# Patient Record
Sex: FEMALE | Race: WHITE | NOT HISPANIC OR LATINO | Employment: UNEMPLOYED | URBAN - METROPOLITAN AREA
[De-identification: names, ages, dates, MRNs, and addresses within clinical notes are randomized per-mention and may not be internally consistent; named-entity substitution may affect disease eponyms.]

---

## 2023-07-28 LAB — HBA1C MFR BLD HPLC: 5.9 %

## 2024-04-29 ENCOUNTER — OFFICE VISIT (OUTPATIENT)
Age: 64
End: 2024-04-29
Payer: COMMERCIAL

## 2024-04-29 VITALS
RESPIRATION RATE: 16 BRPM | OXYGEN SATURATION: 58 % | DIASTOLIC BLOOD PRESSURE: 80 MMHG | TEMPERATURE: 98.2 F | BODY MASS INDEX: 30.19 KG/M2 | HEART RATE: 98 BPM | WEIGHT: 176.8 LBS | HEIGHT: 64 IN | SYSTOLIC BLOOD PRESSURE: 134 MMHG

## 2024-04-29 DIAGNOSIS — R92.8 ABNORMAL MAMMOGRAM OF RIGHT BREAST: ICD-10-CM

## 2024-04-29 DIAGNOSIS — E55.9 INADEQUATE VITAMIN D AND VITAMIN D DERIVATIVE INTAKE: ICD-10-CM

## 2024-04-29 DIAGNOSIS — R73.03 PREDIABETES: ICD-10-CM

## 2024-04-29 DIAGNOSIS — F17.211 CIGARETTE NICOTINE DEPENDENCE IN REMISSION: ICD-10-CM

## 2024-04-29 DIAGNOSIS — E55.9 VITAMIN D INSUFFICIENCY: ICD-10-CM

## 2024-04-29 DIAGNOSIS — E78.5 DYSLIPIDEMIA: ICD-10-CM

## 2024-04-29 DIAGNOSIS — I50.22 CHRONIC SYSTOLIC CONGESTIVE HEART FAILURE (HCC): ICD-10-CM

## 2024-04-29 DIAGNOSIS — Z12.31 ENCOUNTER FOR SCREENING MAMMOGRAM FOR BREAST CANCER: ICD-10-CM

## 2024-04-29 DIAGNOSIS — Z78.0 ASYMPTOMATIC UNCOMPLICATED MENOPAUSE: ICD-10-CM

## 2024-04-29 DIAGNOSIS — Z11.59 NEED FOR HEPATITIS C SCREENING TEST: ICD-10-CM

## 2024-04-29 DIAGNOSIS — Z11.4 SCREENING FOR HIV (HUMAN IMMUNODEFICIENCY VIRUS): ICD-10-CM

## 2024-04-29 DIAGNOSIS — I10 PRIMARY HYPERTENSION: ICD-10-CM

## 2024-04-29 DIAGNOSIS — R41.3 MEMORY CHANGE: ICD-10-CM

## 2024-04-29 DIAGNOSIS — H93.13 TINNITUS OF BOTH EARS: ICD-10-CM

## 2024-04-29 DIAGNOSIS — Z00.00 HEALTHCARE MAINTENANCE: Primary | ICD-10-CM

## 2024-04-29 DIAGNOSIS — Z12.4 SCREENING FOR CERVICAL CANCER: ICD-10-CM

## 2024-04-29 PROCEDURE — 99214 OFFICE O/P EST MOD 30 MIN: CPT | Performed by: FAMILY MEDICINE

## 2024-04-29 PROCEDURE — 99386 PREV VISIT NEW AGE 40-64: CPT | Performed by: FAMILY MEDICINE

## 2024-04-29 RX ORDER — ASPIRIN 81 MG/1
81 TABLET, CHEWABLE ORAL DAILY
COMMUNITY

## 2024-04-29 RX ORDER — FUROSEMIDE 40 MG/1
40 TABLET ORAL 2 TIMES DAILY
COMMUNITY
End: 2024-05-08 | Stop reason: ALTCHOICE

## 2024-04-29 RX ORDER — LISINOPRIL 20 MG/1
20 TABLET ORAL DAILY
COMMUNITY

## 2024-04-29 RX ORDER — DULOXETIN HYDROCHLORIDE 30 MG/1
30 CAPSULE, DELAYED RELEASE ORAL DAILY
COMMUNITY

## 2024-04-29 RX ORDER — CARVEDILOL 12.5 MG/1
12.5 TABLET ORAL 2 TIMES DAILY WITH MEALS
COMMUNITY

## 2024-04-29 NOTE — PROGRESS NOTES
[facility] PRIMARY CARE  [address] [place]    Name: Mercedes Cardozo      YOB: 1960      MRN: [id number]  Encounter Provider: Arturo Dasilva MD      Encounter Date: 04/29/24      ASSESSMENT & PLAN      Alcohol/drug use: discussed moderation in alcohol intake, the recommendations for healthy alcohol use, and avoidance of illicit drug use.  Dental Health: discussed importance of regular tooth brushing, flossing, and dental visits.  Injury prevention: discussed safety/seat belts, safety helmets, smoke detectors, carbon dioxide detectors, and smoking near bedding or upholstery.  Sexual health: discussed sexually transmitted diseases, partner selection, use of condoms, avoidance of unintended pregnancy, and contraceptive alternatives.  Exercise: the importance of regular exercise/physical activity was discussed. Recommend exercise 3-5 times per week for at least 30 minutes.          Assessment/Plan      Healthcare Maintenance  Health maintenance completed today  - Medical history reviewed, including existing medical conditions, medications, and surgeries.   - Labs discussed to evaluate cholesterol, blood sugar, kidney function, liver function, and other important markers of health.  - BMI evaluated and discussed.  - Lifestyle and health counseling completed including diet, exercise habits, smoking status, alcohol consumption.   - Bone & Heart health reviewed  - Cancer screenings discussed: Mammogram/Pap smear/CT lung/colonoscopy.   - Vaccination status reviewed and pertinent immunizations and booster shots discussed.  - Skin examination: Discussed importance of sunscreen and other preventative measures for skin cancer.  - Mental health and wellbeing evaluated and discussed.  - Family history obtained to identify any of hereditary health risks.  Lab orders in place as discussed  Start/continue preventative measures as  discussed/advised  Complete preventative orders in place as recommended.   Refer to screenings problem list  Return in 2 to 4 weeks after completion of labs.    Screenings  Cervical cancer screening Not on file  Mammogram Not on file   Colonoscopy Not on file Not on file  Cologuard/FIT Not on file   CT lung    DEXA scan Not on file  Orders in place as discussed    Primary hypertension  Currently on lisinopril 20 mg daily  Blood Pressure: 134/80    Continue current regimen  Advised low-sodium, high potassium diet  Follow-up with blood work return after completion of labs.    Chronic systolic congestive heart failure  Previously followed cardiology outside of network.  Requires referral to to establish with [facility].  Currently on carvedilol 12.5 mg twice daily, Lasix 40 mg daily, aspirin 81.  Referral placed for cardiology  Discussed decreasing Lasix to 20 mg daily, then further decrease to as needed and monitor symptoms.  Patient mostly using to get rid of water weight, not necessarily for symptoms of shortness of breath on exertion at this time.  Return after completion of labs    Depression  Currently on Cymbalta 30 mg daily  Follow-up with blood work return in 2 to 4 weeks        DIAGNOSIS & ORDERS     Diagnoses and all orders for this visit:    Healthcare maintenance    Need for hepatitis C screening test  -     Hepatitis C Antibody; Future    Screening for HIV (human immunodeficiency virus)  -     HIV 1/2 AG/AB w Reflex [facility] for 2 yr old and above; Future    Encounter for screening mammogram for breast cancer  -     Mammo diagnostic bilateral w cad; Future    Screening for cervical cancer  -     Ambulatory referral to Obstetrics / Gynecology; Future    Vitamin D insufficiency  -     Vitamin D 25 hydroxy; Future    Inadequate vitamin D and vitamin D derivative intake  -     Vitamin D 25 hydroxy; Future    Asymptomatic uncomplicated menopause  -     DXA bone density spine hip and pelvis; Future    Cigarette  nicotine dependence in remission  -     CT lung screening program; Future    Prediabetes  -     CBC and differential; Future  -     Comprehensive metabolic panel; Future  -     Hemoglobin A1C; Future    Dyslipidemia  -     Lipid Panel with Direct LDL reflex; Future    Primary hypertension  -     TSH, 3rd generation with Free T4 reflex; Future    Memory change  -     Vitamin B12; Future    Abnormal mammogram of right breast  -     Mammo diagnostic bilateral w cad; Future    Chronic systolic congestive heart failure (HCC)  -     Ambulatory Referral to Cardiology; Future    Tinnitus of both ears  -     Ambulatory Referral to Otolaryngology; Future    Other orders  -     furosemide (LASIX) 40 mg tablet; Take 40 mg by mouth 2 (two) times a day  -     lisinopril (ZESTRIL) 20 mg tablet; Take 20 mg by mouth daily  -     carvedilol (COREG) 12.5 mg tablet; Take 12.5 mg by mouth 2 (two) times a day with meals  -     cholecalciferol 1,000 units tablet; Take 1,000 Units by mouth daily  -     DULoxetine (CYMBALTA) 30 mg delayed release capsule; Take 30 mg by mouth daily  -     aspirin 81 mg chewable tablet; Chew 81 mg daily            FOLLOW-UP PLANS   Return in about 2 weeks (around 5/13/2024).      Subjective:     Mercedes Cardozo is a 63 y.o. who is here for annual physical exam.    Mercedes Cardozo reports living daugther (asha 33 year old ).   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   Cigarette smoking quit 10 years 35-40 years.   Stent placement several years ago.       Concerns today: Yes, describe: weight, ear crackles, numbness of the upper extremities.    Diet and Physical Activity  Diet: well balanced diet  Exercise: 5-7 times a week on average  Do you struggle with your weight? Yes, describe: gained 5-10 lbs over the last year    General Health  Sleep: sleeps poorly and gets 7-8 hours of sleep on average   Hearing: normal - bilateral  Vision: wears  glasses and wears contacts  Dental: no dental visits for >1 year and brushes teeth twice daily    Mental Health  PHQ-2/9 Depression Screening    Little interest or pleasure in doing things: 0 - not at all  Feeling down, depressed, or hopeless: 0 - not at all  PHQ-2 Score: 0  PHQ-2 Interpretation: Negative depression screen       Anxiety: No  History of SI/SH: No  Significant past trauma that has impacted patient's mental health: No    /GYN Health  Urinary symptoms: none  Menstrual cycles: LMP, 40.   Sexually Active: No  single partner, contraception - none    Smoking/Alcohol Use:  Smoking Cig: Yes, describe: quit 10 years 35-40 years.   Vaping: No  Recreational drugs: No  Alcohol consumption: Yes, describe: weekends, wine    Review of Systems   Constitutional:  Negative for chills, fever and unexpected weight change.   HENT:  Negative for congestion, rhinorrhea and sore throat.    Eyes:  Negative for visual disturbance.   Respiratory:  Negative for chest tightness, shortness of breath and wheezing.    Cardiovascular:  Negative for chest pain.   Gastrointestinal:  Negative for abdominal pain, constipation, diarrhea, nausea and vomiting.   Endocrine: Negative for polyuria.   Genitourinary:  Negative for dysuria and hematuria.   Skin:  Negative for rash.   Neurological:  Negative for dizziness, weakness, light-headedness and headaches.   Psychiatric/Behavioral:  Negative for confusion.        Allergies:   Allergies   Allergen Reactions    Green Tea (Danica Sinensis) - Food Allergy Anaphylaxis    Shellfish Allergy - Food Allergy Anaphylaxis     Oysters only        Social history:   Social Determinants of Health     Tobacco Use: Medium Risk (4/29/2024)    Patient History     Smoking Tobacco Use: Former     Smokeless Tobacco Use: Never     Passive Exposure: Past   Alcohol Use: Not on file   Financial Resource Strain: Not on file   Food Insecurity: Not on file   Transportation Needs: Not on file   Physical Activity:  Not on file   Stress: Not on file   Social Connections: Not on file   Intimate Partner Violence: Not on file   Depression: Not at risk (2024)    PHQ-2     PHQ-2 Score: 0   Housing Stability: Not on file   Utilities: Not on file   Health Literacy: Not on file        Surgical history:   Past Surgical History:   Procedure Laterality Date    CARDIAC SURGERY       SECTION      HAND SURGERY          Family history:  Family History   Problem Relation Age of Onset    Arthritis Mother     Hypertension Mother     Stroke Mother     COPD Father     Leukemia Father     Hyperlipidemia Sister     Mental illness Brother             Current Medication List:     Current Outpatient Medications:     aspirin 81 mg chewable tablet, Chew 81 mg daily, Disp: , Rfl:     carvedilol (COREG) 12.5 mg tablet, Take 12.5 mg by mouth 2 (two) times a day with meals, Disp: , Rfl:     cholecalciferol 1,000 units tablet, Take 1,000 Units by mouth daily, Disp: , Rfl:     DULoxetine (CYMBALTA) 30 mg delayed release capsule, Take 30 mg by mouth daily, Disp: , Rfl:     furosemide (LASIX) 40 mg tablet, Take 40 mg by mouth 2 (two) times a day, Disp: , Rfl:     lisinopril (ZESTRIL) 20 mg tablet, Take 20 mg by mouth daily, Disp: , Rfl:       Objective:     Vitals:    24 1440   BP: 134/80   Pulse: 98   Resp: 16   Temp: 98.2 °F (36.8 °C)   SpO2: (!) 58%       Physical Exam  Vitals reviewed.   Constitutional:       General: She is not in acute distress.     Appearance: Normal appearance. She is not ill-appearing, toxic-appearing or diaphoretic.   HENT:      Head: Normocephalic and atraumatic.      Right Ear: External ear normal.      Left Ear: External ear normal.      Nose: Nose normal.      Mouth/Throat:      Mouth: Mucous membranes are moist.   Eyes:      General: No scleral icterus.        Right eye: No discharge.         Left eye: No discharge.      Extraocular Movements: Extraocular movements intact.      Conjunctiva/sclera: Conjunctivae  normal.   Cardiovascular:      Rate and Rhythm: Normal rate and regular rhythm.      Pulses: Normal pulses.      Heart sounds: Normal heart sounds.   Pulmonary:      Effort: Pulmonary effort is normal. No respiratory distress.      Breath sounds: Normal breath sounds.   Abdominal:      Palpations: Abdomen is soft.      Tenderness: There is no abdominal tenderness.   Musculoskeletal:         General: No swelling. Normal range of motion.      Cervical back: Normal range of motion.   Skin:     General: Skin is warm and dry.   Neurological:      General: No focal deficit present.      Mental Status: She is alert and oriented to person, place, and time.   Psychiatric:         Mood and Affect: Mood normal.         Behavior: Behavior normal.         Thought Content: Thought content normal.             Arturo Dasilva MD  Family Medicine Physician   [facility] PRIMARY CARE [place]

## 2024-04-29 NOTE — PATIENT INSTRUCTIONS
Together as a team our goal is to practice preventative care, avoid chronic diseases, and/or avoid further progression of current chronic diseases.   Here are my recommendations as we discussed during our office visit:    Recommendations for sleep hygiene.   Establish a consistent sleep schedule, which helps to regulate body's internal clock.  This includes the weekend.  Create a relaxing bedtime routine such as reading a book, taking a warm bath, or practicing relaxation techniques such as mindfulness/meditation/breath work  Optimize sleep environment by keeping it dark, quiet, and cool.  Invest in a comfortable mattress and pillow  Limit exposure to screens before bed at least an hour before bedtime.  The bluelight admitted can interfere with the production of sleep hormone melatonin.  Avoid large meals, caffeine, and nicotine close to bedtime.  These can disturb sleep.  Exercise regularly but avoid working out a few hours before bedtime.  Manage stress by practicing stress reducing techniques such as meditation, breath work, and/or yoga to help calm your mind.  Limit naps during the day, and avoid napping too close to bedtime.  Avoid clock watching, this can increase stress and anxiety about not sleeping.  Supplementations (over-the-counter)  Magnesium 400 mg to 1000 mg, start with low dosage and increase based on your response.   Chamomile tea   Supplements that contain kava, 5-hydroxytryptophan      Exercise:  150 minutes of moderate intensity workout for overall general health  200-300 minutes of moderate intensity workout for weight loss.   The first 30 minutes of exercising, the body will take energy from what we ate that day. Then after that 30-minute tamir, the body will take energy from the stored fats.  Therefore, it will be more beneficial to do longer sessions and less frequently in a week to help with our weight loss journey.  I would recommend 60-minute sessions 4 times a week   Strength training 2  times a week for good bone health    Nutritional intake (diet):  Remember, it is not about finding a diet to lose weight quickly, rather adjusting your lifestyle for healthy living long-term.   When we build good habits, it does not become difficult to maintain it.  Focus on a low-carb diet.  The best diet is Mediterranean diet, which is a low-carb diet.  There are good carbs and bad carbs, minimize the bad carbs.    Bad carbs: Refined carbohydrates or simple carbohydrates that have been processed and stripped of their natural fiber and nutrients.          These carbohydrates can lead to rapid spikes in blood sugar levels and are often associated with low nutritional value. The big 4: Bread, Rice, Pasta, Potatoes  Refined grains-white bread, white rice, pasta made from refined flour.  Sugary foods and beverages: Candy, pastries, sugary cereals, soda, and other sugary drinks.  Processed snacks: Chips, cookies, sugary granola bars  Sweetened breakfast cereals: Cereals with added sugars.  Sweets and desserts: Cakes, ice cream, pies  Good carbs: These are referred to complex carbohydrates that are unprocessed or minimally processed, providing more nutrients.  Here examples of good carbs:  Whole grains: Brown rice, quinoa, oats, whole-wheat products   Legumes: Lentils, chickpeas, black beans, kidney beans  Starchy vegetables: Sweet potatoes, butternut squash  Whole fruits: Berries, apples, oranges, bananas  Vegetables: Broccoli, spinach, kale, [place] sprouts  Nuts and seeds: Almonds, walnuts, Ayo seeds, flaxseeds.    Focus on eating whole foods.  What are whole foods?  Whole Foods refer to natural, unprocessed, or minimally processed foods that are as close to the original form as possible.  These foods are in their natural state and have undergone little to no refined or alteration.  Whole Foods are valued for their nutrient density, providing essential vitamins, minerals, fiber, and other beneficial  compounds.  Examples of whole foods include:  Fruits and vegetables without added preservatives or processing.    Whole grains-unrefined grains like brown rice, quinoa, and whole-wheat, which retain their brain, germ, and endosperm.  Legumes-beans, lentils, and peas in their national unprocessed date.  Nuts and seeds-on roasted, unsalted nuts and seeds without added oils or seasonings.  Meat and fish-fresh, unprocessed meats and fish without additives or preservatives.  Dairy-unprocessed dairy products such as milk, yogurt, and cheese without added sugars or artificial ingredients.  Eggs-eggs in their natural form without additives.    Protein requirement  Calculate your protein requirement in grams by multiplying your weight in kilograms by the recommended protein intake per kilogram.  This gives you an estimate of your daily protein requirement.  Age 15-18: 0.9 grams of protein per kilogram of body weight from male gender, 0.9 g of protein per kilogram of body weight for female gender.  Age 19+: 0.8 g of protein per kilogram of body weight for both male and female gender.  If you are physically active or trying to build muscle: 1.2-2.2 g/kg  Example: if you weigh 70 kg, to calculate your protein intake per day multiply 70 by 0.8 = 56 grams per day  To convert your weight to kilograms from pounds: Take your weight in pounds and divided by 2.2046 to get your weight to kilograms.    Sodium/salt  Compare sodium in foods like soup, bread, frozen and packaged meals, then choose the one with lower numbers.  Most Americans should limit their sodium intake to less than 2,300 mg/day.  All -Americans, people with diabetes, high blood pressure, kidney disease, should limit their sodium intake to 1,500 mg/day.    Cooking oil  Avoid the following oil for cooking: Canola, corn, vegetable, sunflower, peanut, sesame, grapeseed, flaxseed.  Even though it states it is heart healthy, however, they are significantly processed  and refined.   Would recommend instead the following cooking oil: Olive oil, coconut oil, avocado oil, ghee, butter.    Intermittent fasting:   There are several benefits of intermittent fasting including weight loss, improving insulin sensitivity, improving cardiovascular health by reducing risk factors like blood pressure, cholesterol levels, and triglycerides. It also promotes brain health, longevity, reduction in inflammatory markers, improves metabolic health, and some studies even suggest intermittent fasting to be protective against certain types of cancers.     The goal of intermittent fasting is to shutdown the insulin hormone, as we discussed, which is a hormone that negatively affects our health when it is around in high levels chronically.     Start intermittent fasting for 14 hours initially, then increase to 16 hours, with a goal to reach 18 hours.  During fasting - may drink water without any additives such as sweeteners, black coffee, tea without any additives including milk.   Eat nutritious meals 2 times a day  Avoid snacking in between meals.  If you end up snacking, snack on fruits/vegetables.  Initially it might be difficult, however, over time you will notice a positive change in your energy, mood, and weight.

## 2024-04-30 ENCOUNTER — TELEPHONE (OUTPATIENT)
Dept: CARDIOLOGY CLINIC | Facility: CLINIC | Age: 64
End: 2024-04-30

## 2024-04-30 NOTE — TELEPHONE ENCOUNTER
Caller: Mercedes     Doctor: Peterson     Reason for call: Pt has an ASAP referral in the system for Dr. Winkler. Unable to find appt availability in time frame of 1-2 weeks. Please advise.     Call back#: [phone]

## 2024-05-01 ENCOUNTER — HOSPITAL ENCOUNTER (OUTPATIENT)
Age: 64
Discharge: HOME | End: 2024-05-01
Payer: COMMERCIAL

## 2024-05-01 VITALS — HEIGHT: 63 IN | BODY MASS INDEX: 31.57 KG/M2

## 2024-05-01 DIAGNOSIS — Z78.0 ASYMPTOMATIC UNCOMPLICATED MENOPAUSE: ICD-10-CM

## 2024-05-01 PROCEDURE — 77080 DXA BONE DENSITY AXIAL: CPT

## 2024-05-01 NOTE — TELEPHONE ENCOUNTER
Please schedule patient with any next available provider. Can look at other [facility] office. Please place on cancellation list  Thanks

## 2024-05-02 NOTE — TELEPHONE ENCOUNTER
Lm for patient to call. Explained no availability but was offering Appt at [facility] at 11:00 tomorrow. Asked patient to call if she would like that appt.

## 2024-05-02 NOTE — TELEPHONE ENCOUNTER
Caller: Mercedes Cardozo    Doctor: New patient    Reason for call:     Patient called in today saying she received a phone call to schedule in [place], She expressed she would prefer [place], I let her know she has an ASAP referral and would need to be seen in a 1 week time frame but she mentioned she is fine also no symptoms and is transitioning care from [facility]. I scheduled her in [place] with  on 5/23/24. If any concerns with time frame please reach out to patient. She wants [place] as her Location of preference.     Call back#: [phone]

## 2024-05-08 ENCOUNTER — APPOINTMENT (OUTPATIENT)
Age: 64
End: 2024-05-08
Payer: COMMERCIAL

## 2024-05-08 DIAGNOSIS — E55.9 VITAMIN D INSUFFICIENCY: ICD-10-CM

## 2024-05-08 DIAGNOSIS — R41.3 MEMORY CHANGE: ICD-10-CM

## 2024-05-08 DIAGNOSIS — E55.9 INADEQUATE VITAMIN D AND VITAMIN D DERIVATIVE INTAKE: ICD-10-CM

## 2024-05-08 DIAGNOSIS — E78.5 DYSLIPIDEMIA: ICD-10-CM

## 2024-05-08 DIAGNOSIS — Z11.4 SCREENING FOR HIV (HUMAN IMMUNODEFICIENCY VIRUS): ICD-10-CM

## 2024-05-08 DIAGNOSIS — R73.03 PREDIABETES: ICD-10-CM

## 2024-05-08 DIAGNOSIS — I10 PRIMARY HYPERTENSION: ICD-10-CM

## 2024-05-08 DIAGNOSIS — Z11.59 NEED FOR HEPATITIS C SCREENING TEST: ICD-10-CM

## 2024-05-08 LAB
25(OH)D3 SERPL-MCNC: 29.7 NG/ML (ref 30–100)
ALBUMIN SERPL BCP-MCNC: 4.4 G/DL (ref 3.5–5)
ALP SERPL-CCNC: 62 U/L (ref 34–104)
ALT SERPL W P-5'-P-CCNC: 53 U/L (ref 7–52)
ANION GAP SERPL CALCULATED.3IONS-SCNC: 8 MMOL/L (ref 4–13)
AST SERPL W P-5'-P-CCNC: 29 U/L (ref 13–39)
BASOPHILS # BLD AUTO: 0.04 THOUSANDS/ÂΜL (ref 0–0.1)
BASOPHILS NFR BLD AUTO: 0 % (ref 0–1)
BILIRUB SERPL-MCNC: 0.28 MG/DL (ref 0.2–1)
BUN SERPL-MCNC: 20 MG/DL (ref 5–25)
CALCIUM SERPL-MCNC: 8.9 MG/DL (ref 8.4–10.2)
CHLORIDE SERPL-SCNC: 107 MMOL/L (ref 96–108)
CHOLEST SERPL-MCNC: 119 MG/DL
CO2 SERPL-SCNC: 29 MMOL/L (ref 21–32)
CREAT SERPL-MCNC: 1 MG/DL (ref 0.6–1.3)
EOSINOPHIL # BLD AUTO: 0.19 THOUSAND/ÂΜL (ref 0–0.61)
EOSINOPHIL NFR BLD AUTO: 2 % (ref 0–6)
ERYTHROCYTE [DISTWIDTH] IN BLOOD BY AUTOMATED COUNT: 14.3 % (ref 11.6–15.1)
EST. AVERAGE GLUCOSE BLD GHB EST-MCNC: 123 MG/DL
GFR SERPL CREATININE-BSD FRML MDRD: 60 ML/MIN/1.73SQ M
GLUCOSE P FAST SERPL-MCNC: 98 MG/DL (ref 65–99)
HBA1C MFR BLD: 5.9 %
HCT VFR BLD AUTO: 41.8 % (ref 34.8–46.1)
HDLC SERPL-MCNC: 42 MG/DL
HGB BLD-MCNC: 12.8 G/DL (ref 11.5–15.4)
IMM GRANULOCYTES # BLD AUTO: 0.01 THOUSAND/UL (ref 0–0.2)
IMM GRANULOCYTES NFR BLD AUTO: 0 % (ref 0–2)
LDLC SERPL CALC-MCNC: 58 MG/DL (ref 0–100)
LYMPHOCYTES # BLD AUTO: 4.74 THOUSANDS/ÂΜL (ref 0.6–4.47)
LYMPHOCYTES NFR BLD AUTO: 52 % (ref 14–44)
MCH RBC QN AUTO: 32 PG (ref 26.8–34.3)
MCHC RBC AUTO-ENTMCNC: 30.6 G/DL (ref 31.4–37.4)
MCV RBC AUTO: 105 FL (ref 82–98)
MONOCYTES # BLD AUTO: 0.52 THOUSAND/ÂΜL (ref 0.17–1.22)
MONOCYTES NFR BLD AUTO: 6 % (ref 4–12)
NEUTROPHILS # BLD AUTO: 3.67 THOUSANDS/ÂΜL (ref 1.85–7.62)
NEUTS SEG NFR BLD AUTO: 40 % (ref 43–75)
NRBC BLD AUTO-RTO: 0 /100 WBCS
PLATELET # BLD AUTO: 189 THOUSANDS/UL (ref 149–390)
PMV BLD AUTO: 11.5 FL (ref 8.9–12.7)
POTASSIUM SERPL-SCNC: 4.4 MMOL/L (ref 3.5–5.3)
PROT SERPL-MCNC: 6.9 G/DL (ref 6.4–8.4)
RBC # BLD AUTO: 4 MILLION/UL (ref 3.81–5.12)
SODIUM SERPL-SCNC: 144 MMOL/L (ref 135–147)
TRIGL SERPL-MCNC: 95 MG/DL
TSH SERPL DL<=0.05 MIU/L-ACNC: 2.43 UIU/ML (ref 0.45–4.5)
VIT B12 SERPL-MCNC: 346 PG/ML (ref 180–914)
WBC # BLD AUTO: 9.17 THOUSAND/UL (ref 4.31–10.16)

## 2024-05-08 PROCEDURE — 36415 COLL VENOUS BLD VENIPUNCTURE: CPT

## 2024-05-08 PROCEDURE — 82607 VITAMIN B-12: CPT

## 2024-05-08 PROCEDURE — 80061 LIPID PANEL: CPT

## 2024-05-08 PROCEDURE — 84443 ASSAY THYROID STIM HORMONE: CPT

## 2024-05-08 PROCEDURE — 87389 HIV-1 AG W/HIV-1&-2 AB AG IA: CPT

## 2024-05-08 PROCEDURE — 80053 COMPREHEN METABOLIC PANEL: CPT

## 2024-05-08 PROCEDURE — 82306 VITAMIN D 25 HYDROXY: CPT

## 2024-05-08 PROCEDURE — 86803 HEPATITIS C AB TEST: CPT

## 2024-05-08 PROCEDURE — 85025 COMPLETE CBC W/AUTO DIFF WBC: CPT

## 2024-05-08 PROCEDURE — 83036 HEMOGLOBIN GLYCOSYLATED A1C: CPT

## 2024-05-09 LAB
HCV AB SER QL: NORMAL
HIV 1+2 AB+HIV1 P24 AG SERPL QL IA: NORMAL
HIV 2 AB SERPL QL IA: NORMAL
HIV1 AB SERPL QL IA: NORMAL
HIV1 P24 AG SERPL QL IA: NORMAL

## 2024-05-10 ENCOUNTER — HOSPITAL ENCOUNTER (OUTPATIENT)
Dept: CT IMAGING | Facility: HOSPITAL | Age: 64
End: 2024-05-10
Attending: FAMILY MEDICINE
Payer: COMMERCIAL

## 2024-05-10 DIAGNOSIS — F17.211 CIGARETTE NICOTINE DEPENDENCE IN REMISSION: ICD-10-CM

## 2024-05-10 PROCEDURE — 71271 CT THORAX LUNG CANCER SCR C-: CPT

## 2024-05-20 ENCOUNTER — OFFICE VISIT (OUTPATIENT)
Age: 64
End: 2024-05-20
Payer: COMMERCIAL

## 2024-05-20 VITALS
HEART RATE: 60 BPM | SYSTOLIC BLOOD PRESSURE: 130 MMHG | RESPIRATION RATE: 14 BRPM | OXYGEN SATURATION: 98 % | WEIGHT: 178 LBS | TEMPERATURE: 97.4 F | BODY MASS INDEX: 30.39 KG/M2 | HEIGHT: 64 IN | DIASTOLIC BLOOD PRESSURE: 70 MMHG

## 2024-05-20 DIAGNOSIS — Z76.89 ENCOUNTER FOR WEIGHT MANAGEMENT: ICD-10-CM

## 2024-05-20 DIAGNOSIS — R73.03 PREDIABETES: ICD-10-CM

## 2024-05-20 DIAGNOSIS — R71.8 ELEVATED MCV: ICD-10-CM

## 2024-05-20 DIAGNOSIS — E55.9 VITAMIN D DEFICIENCY: Primary | ICD-10-CM

## 2024-05-20 PROCEDURE — 99215 OFFICE O/P EST HI 40 MIN: CPT | Performed by: FAMILY MEDICINE

## 2024-05-20 RX ORDER — METFORMIN HYDROCHLORIDE 500 MG/1
500 TABLET, EXTENDED RELEASE ORAL 2 TIMES DAILY WITH MEALS
Qty: 90 TABLET | Refills: 3 | Status: SHIPPED | OUTPATIENT
Start: 2024-05-20

## 2024-05-20 NOTE — PROGRESS NOTES
[facility]  [facility]     Name: Mercedes Cardozo   YOB: 1960   MRN: [id number]  Encounter Provider: Arturo Dasilva MD    Encounter Date: 5/20/2024    ASSESSMENT & PLAN      Assessment & Plan   Primary hypertension  Currently on lisinopril 20 mg daily  Blood Pressure: 130/70    Continue current regimen  Advised low-sodium, high potassium diet  Follow-up with blood work return after completion of labs.     Chronic systolic congestive heart failure  Previously followed cardiology outside of network.  Requires referral to to establish with [facility].  Currently on carvedilol 12.5 mg twice daily, Lasix 40 mg daily, aspirin 81.  Referral placed for cardiology  Discussed decreasing Lasix to 20 mg daily, then further decrease to as needed and monitor symptoms.  Patient mostly using to get rid of water weight, not necessarily for symptoms of shortness of breath on exertion at this time.  This was not discussed during this office visit, follow-up at next office visit.     Depression  Currently on Cymbalta 30 mg daily  Discussed improving coping mechanism  Continue current regimen    Dyslipidemia  Currently not on statin.   Reviewed & discussed labs including lipid panel   Triglycerides/HDL ratio: 2.26. Goal <2, 05/08/2024   Discussed importance of nutritional intake, exercising regularly.  Decrease calorie intake and advised low-carb diet as discussed   Incorporate omega-3 in your diet and start fish oil with omega-3, 2 g daily as discussed.  Limit cholesterol-rich foods as discussed  Monitor lipid panel in 6-12 months.     Chronic kidney disease stage II mild  Lab Results   Component Value Date    EGFR 60 05/08/2024    CREATININE 1.00 05/08/2024   Labs reviewed & discussed   Improve lifestyle and dietary changes to slow its progression  Follow-up with [facility] 3 to 6 months.    Improve lifestyle and dietary changes to slow its progression  Low-sodium, low-carb diet, limit  snacking, exercise regularly.  Avoid nephrotoxic agents including over-the-counter NSAIDs as discussed  Hydrate well with water.     Vitamin B12 insufficiency  Recommended sublingual vitamin B12 daily for 1 to 2 weeks then once a week for 2 months then once a month.  Follow-up with vitamin B12 in 2-3 months.    Weight Management, obesity, class I, BMI 30-34.9, prediabetes  Wt Readings from Last 3 Encounters:   05/20/24 80.7 kg (178 lb)   05/08/24 79.8 kg (176 lb)   04/29/24 80.2 kg (176 lb 12.8 oz)   Initial weight (05/20/24): 178 lbs, Body mass index is 31.04 kg/m².  05/20/24 Body mass index is 31.04 kg/m².  TWL: - lbs  Discussed recent labs including prediabetic range A1c.   Discussed the importance of making dietary and lifestyle changes to avoid progression and the need of multiple antidiabetic meds  Struggling to lose weight, weight changes over the last 12  months  Discussed intermittent fasting and its benefits, 14hr>16hr>18hrs.   Discussed importance of low-carb diet.   Discussed exercise recommendation of 200-300 minutes per week, longer sessions would be rec.  Started on metformin 1000 mg/day extended release  Work on low-carb diet, limit snacking, exercise regularly 200-300 minutes/week as discussed  Return in 4 to 6 weeks for side effect profile for continued care         Vitamin D deficiency  Reviewed & Discussed labs  Deficient vitamin D levels  Goal , preventative therapy   Start vitamin D3 50,000 units two times a week for 12 weeks  After weekly prescription dose, start vitamin D3 5,000 units daily supplements from over-the-counter   Follow-up with vitamin D level after completion of 12-week course   Advised vitamin K2 100-200 mcg daily, preventative measures for vascular calcification.           Depression Screening and Follow-up Plan: Patient was screened for depression during today's encounter. They screened negative with a PHQ-2 score of 0.         DIAGNOSIS & ORDERS     1. Vitamin D  "deficiency  -     Vitamin D 25 hydroxy; Future; Expected date: 08/20/2024  -     Cholecalciferol (Vitamin D3) 1.25 MG (36747 UT) TABS; 2 times a week for 12 weeks.  2. Elevated MCV  -     CBC and Platelet; Future; Expected date: 06/20/2024  3. Encounter for weight management  -     metFORMIN (GLUCOPHAGE-XR) 500 mg 24 hr tablet; Take 1 tablet (500 mg total) by mouth 2 (two) times a day with meals  4. Prediabetes  -     metFORMIN (GLUCOPHAGE-XR) 500 mg 24 hr tablet; Take 1 tablet (500 mg total) by mouth 2 (two) times a day with meals    FOLLOW-UP PLANS   Return in about 4 weeks (around 6/17/2024) for weight management].      Current Medication List:     Current Outpatient Medications:     aspirin 81 mg chewable tablet, Chew 81 mg daily, Disp: , Rfl:     carvedilol (COREG) 12.5 mg tablet, Take 12.5 mg by mouth 2 (two) times a day with meals, Disp: , Rfl:     Cholecalciferol (Vitamin D3) 1.25 MG (32240 UT) TABS, 2 times a week for 12 weeks., Disp: 24 tablet, Rfl: 0    DULoxetine (CYMBALTA) 30 mg delayed release capsule, Take 30 mg by mouth daily, Disp: , Rfl:     lisinopril (ZESTRIL) 20 mg tablet, Take 20 mg by mouth daily, Disp: , Rfl:     metFORMIN (GLUCOPHAGE-XR) 500 mg 24 hr tablet, Take 1 tablet (500 mg total) by mouth 2 (two) times a day with meals, Disp: 90 tablet, Rfl: 3    rosuvastatin (CRESTOR) 40 MG tablet, Take 20 mg by mouth daily, Disp: , Rfl:       Subjective   History of Present Illness       Mercedes Cardozo is here for an office visit.   Mercedes Cas reports living daugther (asha 33 year old ).   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   Cigarette smoking quit 10 years 35-40 years.   Stent placement several years ago.         Review of Systems        Objective:     /70 (BP Location: Right arm, Patient Position: Sitting, Cuff Size: Standard)   Pulse 60   Temp (!) 97.4 °F (36.3 °C) (Tympanic)   Resp 14   Ht 5' 3.5\" (1.613 m) " Comment: confirmed by wall measure  Wt 80.7 kg (178 lb)   SpO2 98%   BMI 31.04 kg/m²      Physical Exam  Vitals reviewed.   Constitutional:       General: She is not in acute distress.     Appearance: Normal appearance. She is obese. She is not ill-appearing, toxic-appearing or diaphoretic.   HENT:      Head: Normocephalic and atraumatic.      Right Ear: External ear normal.      Left Ear: External ear normal.      Nose: No congestion or rhinorrhea.   Eyes:      General: No scleral icterus.        Right eye: No discharge.         Left eye: No discharge.      Conjunctiva/sclera: Conjunctivae normal.   Cardiovascular:      Rate and Rhythm: Normal rate.   Pulmonary:      Effort: Pulmonary effort is normal. No respiratory distress.   Neurological:      General: No focal deficit present.      Mental Status: She is alert and oriented to person, place, and time.   Psychiatric:         Mood and Affect: Mood normal.         Behavior: Behavior normal.         Thought Content: Thought content normal.           Arturo Dasilva MD  Family Medicine Physician   [facility] PRIMARY CARE [place]        Administrative Statements     I have spent a total time of >40 minutes on 05/20/24 in caring for this patient including Diagnostic results, Prognosis, Risks and benefits of tx options, Instructions for management, Patient and family education, Importance of tx compliance, Risk factor reductions, Impressions, Counseling / Coordination of care, Documenting in the medical record, Reviewing / ordering tests, medicine, procedures  , and Obtaining or reviewing history  .

## 2024-05-20 NOTE — PATIENT INSTRUCTIONS
Recommendations for bone and heart health  Take vitamin D3 50,000 units twice a week for 12 weeks to boost to high-normal levels, prescription sent to pharmacy.  If insurance does not cover or it is too expensive, you can get high-dose vitamin D3 from vitamin shops or on [employer]. Can get Vitamin D3 at 10,000 units, take 5 capsules 2 times a week for 12 weeks.   During high-dose boost of vitamin D3, take Vitamin K2 100-200 mcg daily with food. It can be purchased from vitamin [employer] or [employer].   Once completed with the high-dose course, begin maintenance dose of vitamin D3 5,000 units-vitamin K2 200 mcg combination pill. Take daily with food to maintain high-normal levels of vitamin D. The vitamin K2 will help to prevent vascular calcification, read texts below.   Take fish oil with omega-3 at dosage of 0489-3425 mg daily to reduce risk of heart disease.   Follow-up with repeat vitamin D blood work after completing the high-dose prescription course to monitor for improvement.   Vitamin b12 sublingual daily for 1-2 weeks then once a week for 2 months then once a month.   Folic acid 1 mg tablet daily       Why is Vitamin D important?  Adequate vitamin D levels reduces the risk of fractures and osteoporosis.  Vitamin D is involved in modulating the immune system, enhancing the body's defense against infections and supporting overall immune function.  Adequate vitamin D levels are associated with better muscle strength and function.  Deficiency may contribute to muscle weakness and an increase risk of falls in older adults.  Multiple research studies have associated low vitamin D with increased risk of autoimmune disease, cancers including breast cancer, and multiple sclerosis.  Some research also link vitamin D deficiency to increase risk of cardiovascular disease. Vitamin D also plays a role in synthesis of various hormones, including insulin.  It may play a role in insulin sensitivity and glucose metabolism. Adequate  levels of vitamin D may also plays a role in supporting mental wellbeing.  In conclusion, lets bring your vitamin D levels up to >65 to prevent many diseases and conditions!     Why is vitamin K2 important?  Vitamin K2 helps ensure that the calcium we obtain through diet/supplements is directed to the bones for proper mineralization, contributing to bone strength and density.  Vitamin K2 also helps to activate proteins that prevent calcium from depositing in the arterial walls and reducing the risk of arterial calcification. Arterial calcification leads to heart and vascular disease. Especially since we are optimizing your vitamin D levels, one of the functions for vitamin D is to absorb calcium from the gastrointestinal track. This will increase calcium levels in the body. Supplementing vitamin K2 will ensure in removing the calcium from the blood vessels. This will reduce calcification of the vessels, reducing risk of heart and vascular disease.  Let's get your calcium where it belongs, into the bones and out of the vessels with vitamin K2!    Why is fish oil with omega-3 important?  Omega-3 fatty acids and fish oil or associated with reduced risk of heart disease.  They help to improve balance of cholesterol by increasing HDL (good cholesterol) and reducing triglycerides. They also reduce blood pressure and have anti-inflammatory effects contributing to overall cardiovascular health.  Omega-threes have anti-inflammatory properties that may benefit individuals with joint conditions such as rheumatoid arthritis, and other chronic inflammatory conditions such as IBD.  They can help reduce inflammation and alleviate symptoms. Let's prevent diseases, avoid the need of medications and start your healthy journey with these supplements (in addition to improving your nutritional intake and regular exercise)!          Together as a team our goal is to practice preventative care, avoid chronic diseases, and/or avoid further  progression of current chronic diseases.   Here are my recommendations as we discussed during our office visit:    Exercise:  150 minutes of moderate intensity workout for overall general health  200-300 minutes of moderate intensity workout for weight loss.   The first 30 minutes of exercising, the body will take energy from what we ate that day. Then after that 30-minute tamir, the body will take energy from the stored fats.  Therefore, it will be more beneficial to do longer sessions and less frequently in a week to help with our weight loss journey.  I would recommend 60-minute sessions 4 times a week   Strength training 2 times a week for good bone health    Nutritional intake (diet):  Remember, it is not about finding a diet to lose weight quickly, rather adjusting your lifestyle for healthy living long-term.   When we build good habits, it does not become difficult to maintain it.  Focus on a low-carb diet.  The best diet is Mediterranean diet, which is a low-carb diet.  There are good carbs and bad carbs, minimize the bad carbs.    Bad carbs: Refined carbohydrates or simple carbohydrates that have been processed and stripped of their natural fiber and nutrients.          These carbohydrates can lead to rapid spikes in blood sugar levels and are often associated with low nutritional value. The big 4: Bread, Rice, Pasta, Potatoes  Refined grains-white bread, white rice, pasta made from refined flour.  Sugary foods and beverages: Candy, pastries, sugary cereals, soda, and other sugary drinks.  Processed snacks: Chips, cookies, sugary granola bars  Sweetened breakfast cereals: Cereals with added sugars.  Sweets and desserts: Cakes, ice cream, pies  Good carbs: These are referred to complex carbohydrates that are unprocessed or minimally processed, providing more nutrients.  Here examples of good carbs:  Whole grains: Brown rice, quinoa, oats, whole-wheat products   Legumes: Lentils, chickpeas, black beans, kidney  beans  Starchy vegetables: Sweet potatoes, butternut squash  Whole fruits: Berries, apples, oranges, bananas  Vegetables: Broccoli, spinach, kale, [place] sprouts  Nuts and seeds: Almonds, walnuts, Ayo seeds, flaxseeds.    Focus on eating whole foods.  What are whole foods?  Whole Foods refer to natural, unprocessed, or minimally processed foods that are as close to the original form as possible.  These foods are in their natural state and have undergone little to no refined or alteration.  Whole Foods are valued for their nutrient density, providing essential vitamins, minerals, fiber, and other beneficial compounds.  Examples of whole foods include:  Fruits and vegetables without added preservatives or processing.    Whole grains-unrefined grains like brown rice, quinoa, and whole-wheat, which retain their brain, germ, and endosperm.  Legumes-beans, lentils, and peas in their national unprocessed date.  Nuts and seeds-on roasted, unsalted nuts and seeds without added oils or seasonings.  Meat and fish-fresh, unprocessed meats and fish without additives or preservatives.  Dairy-unprocessed dairy products such as milk, yogurt, and cheese without added sugars or artificial ingredients.  Eggs-eggs in their natural form without additives.    Protein requirement  Calculate your protein requirement in grams by multiplying your weight in kilograms by the recommended protein intake per kilogram.  This gives you an estimate of your daily protein requirement.  Age 15-18: 0.9 grams of protein per kilogram of body weight from male gender, 0.9 g of protein per kilogram of body weight for female gender.  Age 19+: 0.8 g of protein per kilogram of body weight for both male and female gender.  If you are physically active or trying to build muscle: 1.2-2.2 g/kg  Example: if you weigh 70 kg, to calculate your protein intake per day multiply 70 by 0.8 = 56 grams per day  To convert your weight to kilograms from pounds: Take your  weight in pounds and divided by 2.2046 to get your weight to kilograms.    Sodium/salt  Compare sodium in foods like soup, bread, frozen and packaged meals, then choose the one with lower numbers.  Most Americans should limit their sodium intake to less than 2,300 mg/day.  All -Americans, people with diabetes, high blood pressure, kidney disease, should limit their sodium intake to 1,500 mg/day.    Cooking oil  Avoid the following oil for cooking: Canola, corn, vegetable, sunflower, peanut, sesame, grapeseed, flaxseed.  Even though it states it is heart healthy, however, they are significantly processed and refined.   Would recommend instead the following cooking oil: Olive oil, coconut oil, avocado oil, ghee, butter.    Intermittent fasting:   There are several benefits of intermittent fasting including weight loss, improving insulin sensitivity, improving cardiovascular health by reducing risk factors like blood pressure, cholesterol levels, and triglycerides. It also promotes brain health, longevity, reduction in inflammatory markers, improves metabolic health, and some studies even suggest intermittent fasting to be protective against certain types of cancers.     The goal of intermittent fasting is to shutdown the insulin hormone, as we discussed, which is a hormone that negatively affects our health when it is around in high levels chronically.     Start intermittent fasting for 14 hours initially, then increase to 16 hours, with a goal to reach 18 hours.  During fasting - may drink water without any additives such as sweeteners, black coffee, tea without any additives including milk.   Eat nutritious meals 2 times a day  Avoid snacking in between meals.  If you end up snacking, snack on fruits/vegetables.  Initially it might be difficult, however, over time you will notice a positive change in your energy, mood, and weight.

## 2024-05-21 DIAGNOSIS — R91.1 NODULE OF UPPER LOBE OF RIGHT LUNG: Primary | ICD-10-CM

## 2024-05-21 NOTE — PROGRESS NOTES
CT chest lung cancer screening completed on 5/10/2024, result note reviewed.  It showed 7 mm right upper lobe pulmonary nodule.  Recommended short-term follow-up with 6-month LD CT. Expected date 11/7/2024. Will discuss with patient at next apt in July.

## 2024-05-23 ENCOUNTER — OFFICE VISIT (OUTPATIENT)
Dept: CARDIOLOGY CLINIC | Facility: CLINIC | Age: 64
End: 2024-05-23
Payer: COMMERCIAL

## 2024-05-23 VITALS
HEIGHT: 64 IN | RESPIRATION RATE: 16 BRPM | SYSTOLIC BLOOD PRESSURE: 130 MMHG | WEIGHT: 176 LBS | HEART RATE: 56 BPM | OXYGEN SATURATION: 97 % | DIASTOLIC BLOOD PRESSURE: 80 MMHG | BODY MASS INDEX: 30.05 KG/M2

## 2024-05-23 DIAGNOSIS — I10 PRIMARY HYPERTENSION: ICD-10-CM

## 2024-05-23 DIAGNOSIS — E78.5 DYSLIPIDEMIA: ICD-10-CM

## 2024-05-23 DIAGNOSIS — I50.9 CHRONIC CONGESTIVE HEART FAILURE, UNSPECIFIED HEART FAILURE TYPE (HCC): Primary | ICD-10-CM

## 2024-05-23 DIAGNOSIS — I25.10 CORONARY ARTERY DISEASE INVOLVING NATIVE CORONARY ARTERY OF NATIVE HEART WITHOUT ANGINA PECTORIS: ICD-10-CM

## 2024-05-23 PROCEDURE — 93000 ELECTROCARDIOGRAM COMPLETE: CPT | Performed by: INTERNAL MEDICINE

## 2024-05-23 PROCEDURE — 99204 OFFICE O/P NEW MOD 45 MIN: CPT | Performed by: INTERNAL MEDICINE

## 2024-05-23 RX ORDER — LISINOPRIL 20 MG/1
30 TABLET ORAL DAILY
Qty: 45 TABLET | Refills: 5 | Status: SHIPPED | OUTPATIENT
Start: 2024-05-23 | End: 2024-11-19

## 2024-05-23 NOTE — PATIENT INSTRUCTIONS
Increase lisinopril 30mg daily  Obtain echocardiogram to assess heart function  Continue rest of cardiac medications  2g sodium diet  2L fluid diet  Daily weights

## 2024-05-23 NOTE — PROGRESS NOTES
"Advanced Heart Failure Outpatient Consult Note - Mercedes Cardozo 63 y.o. female MRN: [id number]    Encounter: [id number]      Assessment/Plan:    Patient Active Problem List    Diagnosis Date Noted    Chronic systolic congestive heart failure (HCC) 05/24/2024    Coronary artery disease involving native coronary artery of native heart without angina pectoris 05/24/2024    Primary hypertension 05/24/2024    Dyslipidemia 05/24/2024     # Chronic heart failure, unspecified type  Euvolemic, currently off diuretic.  # CAD s/p stenting presumably to the LAD 2/2021  Rx: Aspirin, rosuvastatin, Coreg  # Hypertension, mildly elevated  Rx: Coreg and lisinopril  # Hyperlipidemia  5/8/2024 TG 95 HDL 42 LDL 58  Rx: Rosuvastatin 20 mg daily  # Prediabetes HbA1c 5.9 5/8/24    Today's Plan:  Patient reports overall doing well with good activity tolerance.  No active anginal or congestive heart failure symptoms.  Blood pressure mildly elevated.  Will increase lisinopril to 30 mg daily.  Will obtain echocardiogram to assess heart function.  Continue rest of cardiac medications at this time.  Patient to obtain medical records from previous cardiologist for review.  Advised on 2 g sodium and 2 L fluid diet, and daily weights.      HPI:   63-year-old female with past medical history of coronary artery disease status post PCI, heart failure, hypertension, hyperlipidemia and prediabetes who is here to establish care.  Was previously following up with cardiologist [facility].  Patient reports being hospitalized February 2021 with congestive heart failure and extremely elevated blood pressure.  He she also had cardiac catheterization at that time and had stenting x 1 to the \" maker\".  Her blood pressure was untreated prior to that hospitalization but she has had good follow-up posthospitalization.  Blood pressure control overall better and she has been on lisinopril and Coreg since then.  Recent home blood pressure in the 130s.  " Reports being on furosemide up until 1 month ago.  She denies any weight gain or shortness of breath while being off of diuretic.  She had a sporadic episode of chest pain while she was stating that lasted for a minute, no recurrence.  Denies any palpitations over the past 6 months to 1 year.  She is staying active kayaking and swimming 1 to 3 hours a day.  Social history: Stopped smoking 10 years ago, occasional alcohol intake up to a bottle on a weekend.  No illicit drug use  Family history: Sister and parents with hypertension, no known coronary disease      Past Medical History:   Diagnosis Date    Allergic     Depression     Elevated cholesterol     Fatty liver     Hypertension        Review of Systems   Constitutional:  Negative for chills and fever.   HENT:  Negative for ear pain and sore throat.    Eyes:  Negative for pain and visual disturbance.   Respiratory:  Negative for cough and shortness of breath.    Cardiovascular:  Negative for chest pain, palpitations and leg swelling.   Gastrointestinal:  Negative for abdominal distention, abdominal pain and vomiting.   Genitourinary:  Negative for dysuria and hematuria.   Musculoskeletal:  Negative for arthralgias and back pain.   Skin:  Negative for color change and rash.   Neurological:  Negative for dizziness, seizures, syncope and light-headedness.   All other systems reviewed and are negative.       Allergies   Allergen Reactions    Green Tea (Danica Sinensis) - Food Allergy Anaphylaxis    Shellfish Allergy - Food Allergy Anaphylaxis     Oysters only     .    Current Outpatient Medications:     aspirin 81 mg chewable tablet, Chew 81 mg daily, Disp: , Rfl:     carvedilol (COREG) 12.5 mg tablet, Take 12.5 mg by mouth 2 (two) times a day with meals, Disp: , Rfl:     Cholecalciferol (Vitamin D3) 1.25 MG (98164 UT) TABS, 2 times a week for 12 weeks., Disp: 24 tablet, Rfl: 0    DULoxetine (CYMBALTA) 30 mg delayed release capsule, Take 30 mg by mouth daily,  "Disp: , Rfl:     lisinopril (ZESTRIL) 20 mg tablet, Take 1.5 tablets (30 mg total) by mouth daily, Disp: 45 tablet, Rfl: 5    metFORMIN (GLUCOPHAGE-XR) 500 mg 24 hr tablet, Take 1 tablet (500 mg total) by mouth 2 (two) times a day with meals, Disp: 90 tablet, Rfl: 3    rosuvastatin (CRESTOR) 40 MG tablet, Take 20 mg by mouth daily, Disp: , Rfl:     Social History     Socioeconomic History    Marital status:      Spouse name: Not on file    Number of children: Not on file    Years of education: Not on file    Highest education level: Not on file   Occupational History    Not on file   Tobacco Use    Smoking status: Former     Types: Cigarettes     Passive exposure: Past    Smokeless tobacco: Never   Vaping Use    Vaping status: Never Used   Substance and Sexual Activity    Alcohol use: Yes     Alcohol/week: 4.0 standard drinks of alcohol     Types: 4 Glasses of wine per week    Drug use: Never    Sexual activity: Not on file   Other Topics Concern    Not on file   Social History Narrative    Not on file     Social Determinants of Health     Financial Resource Strain: Not on file   Food Insecurity: Not on file   Transportation Needs: Not on file   Physical Activity: Sufficiently Active (5/20/2024)    Exercise Vital Sign     Days of Exercise per Week: 7 days     Minutes of Exercise per Session: 50 min   Stress: Not on file   Social Connections: Not on file   Intimate Partner Violence: Not on file   Housing Stability: Not on file       Family History   Problem Relation Age of Onset    Arthritis Mother     Hypertension Mother     Stroke Mother     COPD Father     Leukemia Father     Hyperlipidemia Sister     Mental illness Brother        Physical Exam:    Vitals: Blood pressure 130/80, pulse 56, resp. rate 16, height 5' 3.5\" (1.613 m), weight 79.8 kg (176 lb), SpO2 97%., Body mass index is 30.69 kg/m².,   Wt Readings from Last 3 Encounters:   05/23/24 79.8 kg (176 lb)   05/20/24 80.7 kg (178 lb)   05/08/24 79.8 " "kg (176 lb)       Physical Exam  Constitutional:       General: She is not in acute distress.     Appearance: Normal appearance.   HENT:      Head: Normocephalic and atraumatic.      Mouth/Throat:      Mouth: Mucous membranes are moist.   Eyes:      Extraocular Movements: Extraocular movements intact.      Conjunctiva/sclera: Conjunctivae normal.   Neck:      Vascular: No JVD.   Cardiovascular:      Rate and Rhythm: Normal rate and regular rhythm.      Pulses: Normal pulses.      Heart sounds: No murmur heard.     No friction rub. No gallop.   Pulmonary:      Effort: Pulmonary effort is normal. No respiratory distress.      Breath sounds: No wheezing, rhonchi or rales.   Abdominal:      General: There is no distension.      Palpations: Abdomen is soft.      Tenderness: There is no abdominal tenderness. There is no guarding.   Musculoskeletal:         General: No deformity or signs of injury.      Cervical back: Neck supple.      Right lower leg: No edema.      Left lower leg: No edema.   Skin:     General: Skin is warm and dry.      Capillary Refill: Capillary refill takes less than 2 seconds.   Neurological:      General: No focal deficit present.      Mental Status: She is alert and oriented to person, place, and time.   Psychiatric:         Mood and Affect: Mood normal.         Labs & Results:    Lab Results   Component Value Date    WBC 9.17 05/08/2024    HGB 12.8 05/08/2024    HCT 41.8 05/08/2024     (H) 05/08/2024     05/08/2024     Lab Results   Component Value Date    SODIUM 144 05/08/2024    K 4.4 05/08/2024     05/08/2024    CO2 29 05/08/2024    BUN 20 05/08/2024    CREATININE 1.00 05/08/2024    CALCIUM 8.9 05/08/2024     No results found for: \"NTBNP\"   Lab Results   Component Value Date    CHOLESTEROL 119 05/08/2024     Lab Results   Component Value Date    HDL 42 (L) 05/08/2024     Lab Results   Component Value Date    TRIG 95 05/08/2024     No results found for: \"NONHDLC\"    EKG " personally reviewed by Janel Mckeon MD.     Counseling / Coordination of Care  Time spent today 40 minutes.  Greater than 50% of total time was spent with the patient and / or family counseling and / or coordination of care.  We discussed diagnoses, most recent studies and any changes in treatment    Thank you for the opportunity to participate in the care of this patient.    JANEL MCKEON MD  ADVANCED HEART FAILURE AND MECHANICAL CIRCULATORY SUPPORT  [facility]

## 2024-05-24 PROBLEM — I50.22 CHRONIC SYSTOLIC CONGESTIVE HEART FAILURE (HCC): Status: ACTIVE | Noted: 2024-05-24

## 2024-05-24 PROBLEM — E78.5 DYSLIPIDEMIA: Status: ACTIVE | Noted: 2024-05-24

## 2024-05-24 PROBLEM — I25.10 CORONARY ARTERY DISEASE INVOLVING NATIVE CORONARY ARTERY OF NATIVE HEART WITHOUT ANGINA PECTORIS: Status: ACTIVE | Noted: 2024-05-24

## 2024-05-24 PROBLEM — I10 PRIMARY HYPERTENSION: Status: ACTIVE | Noted: 2024-05-24

## 2024-05-30 ENCOUNTER — HOSPITAL ENCOUNTER (OUTPATIENT)
Dept: MAMMOGRAPHY | Facility: CLINIC | Age: 64
End: 2024-05-30
Payer: COMMERCIAL

## 2024-05-30 ENCOUNTER — HOSPITAL ENCOUNTER (OUTPATIENT)
Dept: ULTRASOUND IMAGING | Facility: CLINIC | Age: 64
End: 2024-05-30
Payer: COMMERCIAL

## 2024-05-30 VITALS — BODY MASS INDEX: 30.05 KG/M2 | HEIGHT: 64 IN | WEIGHT: 176 LBS

## 2024-05-30 DIAGNOSIS — Z12.31 ENCOUNTER FOR SCREENING MAMMOGRAM FOR BREAST CANCER: ICD-10-CM

## 2024-05-30 DIAGNOSIS — R92.8 ABNORMAL MAMMOGRAM OF RIGHT BREAST: ICD-10-CM

## 2024-05-30 PROCEDURE — G0279 TOMOSYNTHESIS, MAMMO: HCPCS

## 2024-05-30 PROCEDURE — 77066 DX MAMMO INCL CAD BI: CPT

## 2024-05-30 PROCEDURE — 76642 ULTRASOUND BREAST LIMITED: CPT

## 2024-06-27 ENCOUNTER — HOSPITAL ENCOUNTER (OUTPATIENT)
Dept: NON INVASIVE DIAGNOSTICS | Facility: CLINIC | Age: 64
Discharge: HOME | End: 2024-06-27
Payer: COMMERCIAL

## 2024-06-27 VITALS
SYSTOLIC BLOOD PRESSURE: 130 MMHG | HEART RATE: 48 BPM | BODY MASS INDEX: 31.18 KG/M2 | HEIGHT: 63 IN | DIASTOLIC BLOOD PRESSURE: 80 MMHG | WEIGHT: 176 LBS

## 2024-06-27 DIAGNOSIS — R73.03 PREDIABETES: ICD-10-CM

## 2024-06-27 DIAGNOSIS — I50.9 CHRONIC CONGESTIVE HEART FAILURE, UNSPECIFIED HEART FAILURE TYPE (HCC): ICD-10-CM

## 2024-06-27 DIAGNOSIS — Z76.89 ENCOUNTER FOR WEIGHT MANAGEMENT: ICD-10-CM

## 2024-06-27 LAB
AORTIC ROOT: 3.4 CM
APICAL FOUR CHAMBER EJECTION FRACTION: 61 %
AV LVOT PEAK GRADIENT: 4 MMHG
AV PEAK GRADIENT: 5 MMHG
BSA FOR ECHO PROCEDURE: 1.83 M2
E WAVE DECELERATION TIME: 224 MS
E/A RATIO: 0.88
FRACTIONAL SHORTENING: 26 (ref 28–44)
INTERVENTRICULAR SEPTUM IN DIASTOLE (PARASTERNAL SHORT AXIS VIEW): 1.2 CM
INTERVENTRICULAR SEPTUM: 1.2 CM (ref 0.6–1.1)
LAAS-AP2: 16.5 CM2
LAAS-AP4: 12.4 CM2
LEFT ATRIUM SIZE: 3.7 CM
LEFT ATRIUM VOLUME (MOD BIPLANE): 36 ML
LEFT ATRIUM VOLUME INDEX (MOD BIPLANE): 19.6 ML/M2
LEFT INTERNAL DIMENSION IN SYSTOLE: 3.1 CM (ref 2.1–4)
LEFT VENTRICULAR INTERNAL DIMENSION IN DIASTOLE: 4.2 CM (ref 3.5–6)
LEFT VENTRICULAR POSTERIOR WALL IN END DIASTOLE: 1 CM
LEFT VENTRICULAR STROKE VOLUME: 39 ML
LVSV (TEICH): 39 ML
MV E'TISSUE VEL-SEP: 7 CM/S
MV PEAK A VEL: 1.15 M/S
MV PEAK E VEL: 101 CM/S
MV STENOSIS PRESSURE HALF TIME: 65 MS
MV VALVE AREA P 1/2 METHOD: 3.38
RIGHT ATRIUM AREA SYSTOLE A4C: 14 CM2
RIGHT VENTRICLE ID DIMENSION: 3 CM
SL CV LEFT ATRIUM LENGTH A2C: 5 CM
SL CV LV EF: 61
SL CV PED ECHO LEFT VENTRICLE DIASTOLIC VOLUME (MOD BIPLANE) 2D: 77 ML
SL CV PED ECHO LEFT VENTRICLE SYSTOLIC VOLUME (MOD BIPLANE) 2D: 38 ML
TR MAX PG: 28 MMHG
TR PEAK VELOCITY: 2.6 M/S
TRICUSPID ANNULAR PLANE SYSTOLIC EXCURSION: 1.7 CM
TRICUSPID VALVE PEAK REGURGITATION VELOCITY: 2.64 M/S

## 2024-06-27 PROCEDURE — 93306 TTE W/DOPPLER COMPLETE: CPT | Performed by: INTERNAL MEDICINE

## 2024-06-27 PROCEDURE — 93306 TTE W/DOPPLER COMPLETE: CPT

## 2024-06-27 RX ORDER — METFORMIN HYDROCHLORIDE 500 MG/1
500 TABLET, EXTENDED RELEASE ORAL 2 TIMES DAILY WITH MEALS
Qty: 180 TABLET | Refills: 1 | Status: SHIPPED | OUTPATIENT
Start: 2024-06-27

## 2024-07-09 ENCOUNTER — OFFICE VISIT (OUTPATIENT)
Age: 64
End: 2024-07-09
Payer: COMMERCIAL

## 2024-07-09 VITALS
BODY MASS INDEX: 30.33 KG/M2 | WEIGHT: 171.2 LBS | TEMPERATURE: 96.8 F | DIASTOLIC BLOOD PRESSURE: 68 MMHG | HEART RATE: 58 BPM | HEIGHT: 63 IN | OXYGEN SATURATION: 98 % | SYSTOLIC BLOOD PRESSURE: 120 MMHG | RESPIRATION RATE: 14 BRPM

## 2024-07-09 DIAGNOSIS — E78.5 DYSLIPIDEMIA: ICD-10-CM

## 2024-07-09 DIAGNOSIS — I10 PRIMARY HYPERTENSION: ICD-10-CM

## 2024-07-09 DIAGNOSIS — N18.2 CKD (CHRONIC KIDNEY DISEASE) STAGE 2, GFR 60-89 ML/MIN: ICD-10-CM

## 2024-07-09 DIAGNOSIS — I50.22 CHRONIC SYSTOLIC CONGESTIVE HEART FAILURE (HCC): ICD-10-CM

## 2024-07-09 DIAGNOSIS — Z76.89 ENCOUNTER FOR WEIGHT MANAGEMENT: Primary | ICD-10-CM

## 2024-07-09 PROCEDURE — 99214 OFFICE O/P EST MOD 30 MIN: CPT | Performed by: FAMILY MEDICINE

## 2024-07-09 NOTE — PROGRESS NOTES
[facility] PRIMARY CARE  Ambulatory visit     Name: Mercedes Cardozo   YOB: 1960   MRN: [id number]  Encounter Provider: Arturo Dasilva MD    Encounter Date: 7/9/2024    ASSESSMENT & PLAN    Assessment & Plan   Primary hypertension  Blood Pressure: 120/68    Currently on lisinopril 20 mg daily  Continue current regimen  Advised low-sodium, high potassium diet  Follow-up with blood work return after completion of labs.     Chronic systolic congestive heart failure  Following cardiology, Dr. Mckeon.    Currently on carvedilol 12.5 mg twice daily, Lasix 40 mg daily, aspirin 81.  Patient has discontinued Lasix and today reports no has not noted shortness of breath on exertion, swelling since the discontinuation.  Continue off of Lasix.    Symptomatic management of CHF with Lasix as discussed  Follow-up with cardiology to discuss possibility of starting phentermine for weight loss, next appointment August 15.  Started patient on Jardiance 10 mg daily with history of CHF and CKD.  Which will also help with weight loss.     Depression  Currently on Cymbalta 30 mg daily  Discussed improving coping mechanism    Dyslipidemia  Currently not on statin.   Triglycerides/HDL ratio: 2.26. Goal <2, 05/08/2024   Discussed importance of nutritional intake, exercising regularly.  Decrease calorie intake and advised low-carb diet as discussed   Incorporate omega-3 in your diet and start fish oil with omega-3, 2 g daily as discussed.  Limit cholesterol-rich foods as discussed  Monitor lipid panel in 6-12 months.     Chronic kidney disease stage II mild  Started Jardiance 10 mg daily, continue off of Lasix.  Follow-up with [facility] prior to next appointment in 3 months.  Improve lifestyle and dietary changes to slow its progression  Follow-up with BMP 3 to 6 months.    Low-sodium, low-carb diet, limit snacking, exercise regularly.  Avoid nephrotoxic agents including over-the-counter NSAIDs as discussed  Hydrate well  with water.      Vitamin B12 insufficiency  Previously recommended sublingual vitamin B12 daily for 1 to 2 weeks then once a week for 2 months then once a month.  Follow-up with vitamin B12 in 2-3 months.    Weight Management, obesity, class I, BMI 30-34.9, prediabetes  Wt Readings from Last 3 Encounters:   07/09/24 77.7 kg (171 lb 3.2 oz)   06/27/24 79.8 kg (176 lb)   05/30/24 79.8 kg (176 lb)   Initial weight (05/20/24): 178 lbs, Body mass index is 31.04 kg/m².  05/20/24 Body mass index is 31.04 kg/m².  TWL: -7 lbs  Previously started on metformin 1000 mg/day extended release, decreased to 500 mg at night due to side effect of sleepiness in the morning.  Considered starting phentermine however with history of CHF it would be contraindicated.  May discussed with cardiology at next appointment.  With history of CKD and CHF, started patient on Jardiance 10 mg daily.  Work on low-carb diet, limit snacking, exercise regularly 200-300 minutes/week as discussed  Return in 4 to 6 weeks for side effect profile for continued care            Depression Screening and Follow-up Plan: Patient was screened for depression during today's encounter. They screened negative with a PHQ-2 score of 0.       DIAGNOSIS & ORDERS   1. Encounter for weight management  2. Chronic systolic congestive heart failure (HCC)  -     Empagliflozin (JARDIANCE) 10 MG TABS tablet; Take 1 tablet (10 mg total) by mouth daily  3. CKD (chronic kidney disease) stage 2, GFR 60-89 ml/min  -     Empagliflozin (JARDIANCE) 10 MG TABS tablet; Take 1 tablet (10 mg total) by mouth daily  -     Basic metabolic panel; Future; Expected date: 08/09/2024  4. Dyslipidemia  5. Primary hypertension    FOLLOW-UP PLANS   Return in about 3 months (around 10/9/2024) for weight management, ckd.    Current Medication List:     Current Outpatient Medications:     aspirin 81 mg chewable tablet, Chew 81 mg daily, Disp: , Rfl:     carvedilol (COREG) 12.5 mg tablet, Take 12.5 mg by  "mouth 2 (two) times a day with meals, Disp: , Rfl:     Cholecalciferol (Vitamin D3) 1.25 MG (36397 UT) TABS, 2 times a week for 12 weeks., Disp: 24 tablet, Rfl: 0    DULoxetine (CYMBALTA) 30 mg delayed release capsule, Take 30 mg by mouth daily, Disp: , Rfl:     Empagliflozin (JARDIANCE) 10 MG TABS tablet, Take 1 tablet (10 mg total) by mouth daily, Disp: 30 tablet, Rfl: 5    lisinopril (ZESTRIL) 20 mg tablet, Take 1.5 tablets (30 mg total) by mouth daily, Disp: 45 tablet, Rfl: 5    metFORMIN (GLUCOPHAGE-XR) 500 mg 24 hr tablet, TAKE 1 TABLET BY MOUTH TWICE A DAY WITH MEALS, Disp: 180 tablet, Rfl: 1    rosuvastatin (CRESTOR) 40 MG tablet, Take 20 mg by mouth daily, Disp: , Rfl:   Subjective   History of Present Illness       Mercedes Cardozo is here for an office visit.   Mercedes Cardozo reports living daugther (asha 33 year old ).   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   Cigarette smoking quit 10 years 35-40 years.   Stent placement several years ago.       Review of Systems    Objective:     /68 (BP Location: Left arm, Patient Position: Sitting, Cuff Size: Standard)   Pulse 58   Temp (!) 96.8 °F (36 °C) (Tympanic)   Resp 14   Ht 5' 3\" (1.6 m)   Wt 77.7 kg (171 lb 3.2 oz)   SpO2 98%   BMI 30.33 kg/m²      Physical Exam  Vitals reviewed.   Constitutional:       General: She is not in acute distress.     Appearance: Normal appearance. She is obese. She is not ill-appearing, toxic-appearing or diaphoretic.   HENT:      Head: Normocephalic and atraumatic.      Right Ear: External ear normal.      Left Ear: External ear normal.      Nose: No congestion or rhinorrhea.   Eyes:      General: No scleral icterus.        Right eye: No discharge.         Left eye: No discharge.      Conjunctiva/sclera: Conjunctivae normal.   Cardiovascular:      Rate and Rhythm: Normal rate.   Pulmonary:      Effort: Pulmonary effort is normal. No respiratory " distress.   Neurological:      General: No focal deficit present.      Mental Status: She is alert and oriented to person, place, and time.   Psychiatric:         Mood and Affect: Mood normal.         Behavior: Behavior normal.         Thought Content: Thought content normal.           Arturo Dasilva MD  Family Medicine Physician   [facility] PRIMARY [facility] [place]        Administrative Statements

## 2024-07-24 DIAGNOSIS — F41.9 ANXIETY: Primary | ICD-10-CM

## 2024-07-25 RX ORDER — DULOXETIN HYDROCHLORIDE 30 MG/1
30 CAPSULE, DELAYED RELEASE ORAL DAILY
Qty: 90 CAPSULE | Refills: 1 | Status: SHIPPED | OUTPATIENT
Start: 2024-07-25

## 2024-08-09 ENCOUNTER — APPOINTMENT (OUTPATIENT)
Age: 64
End: 2024-08-09
Payer: COMMERCIAL

## 2024-08-09 DIAGNOSIS — N18.2 CKD (CHRONIC KIDNEY DISEASE) STAGE 2, GFR 60-89 ML/MIN: ICD-10-CM

## 2024-08-09 DIAGNOSIS — E55.9 VITAMIN D DEFICIENCY: ICD-10-CM

## 2024-08-09 DIAGNOSIS — R71.8 ELEVATED MCV: ICD-10-CM

## 2024-08-09 DIAGNOSIS — I50.9 CHRONIC CONGESTIVE HEART FAILURE, UNSPECIFIED HEART FAILURE TYPE (HCC): ICD-10-CM

## 2024-08-09 LAB
25(OH)D3 SERPL-MCNC: >120 NG/ML (ref 30–100)
ANION GAP SERPL CALCULATED.3IONS-SCNC: 11 MMOL/L (ref 4–13)
BUN SERPL-MCNC: 17 MG/DL (ref 5–25)
CALCIUM SERPL-MCNC: 9.1 MG/DL (ref 8.4–10.2)
CHLORIDE SERPL-SCNC: 106 MMOL/L (ref 96–108)
CO2 SERPL-SCNC: 25 MMOL/L (ref 21–32)
CREAT SERPL-MCNC: 1.08 MG/DL (ref 0.6–1.3)
ERYTHROCYTE [DISTWIDTH] IN BLOOD BY AUTOMATED COUNT: 14.5 % (ref 11.6–15.1)
GFR SERPL CREATININE-BSD FRML MDRD: 55 ML/MIN/1.73SQ M
GLUCOSE P FAST SERPL-MCNC: 94 MG/DL (ref 65–99)
HCT VFR BLD AUTO: 42.4 % (ref 34.8–46.1)
HGB BLD-MCNC: 13 G/DL (ref 11.5–15.4)
MCH RBC QN AUTO: 31 PG (ref 26.8–34.3)
MCHC RBC AUTO-ENTMCNC: 30.7 G/DL (ref 31.4–37.4)
MCV RBC AUTO: 101 FL (ref 82–98)
PLATELET # BLD AUTO: 205 THOUSANDS/UL (ref 149–390)
PMV BLD AUTO: 10.7 FL (ref 8.9–12.7)
POTASSIUM SERPL-SCNC: 4.6 MMOL/L (ref 3.5–5.3)
RBC # BLD AUTO: 4.2 MILLION/UL (ref 3.81–5.12)
SODIUM SERPL-SCNC: 142 MMOL/L (ref 135–147)
WBC # BLD AUTO: 8.9 THOUSAND/UL (ref 4.31–10.16)

## 2024-08-09 PROCEDURE — 80048 BASIC METABOLIC PNL TOTAL CA: CPT

## 2024-08-09 PROCEDURE — 36415 COLL VENOUS BLD VENIPUNCTURE: CPT

## 2024-08-09 PROCEDURE — 82306 VITAMIN D 25 HYDROXY: CPT

## 2024-08-09 PROCEDURE — 85027 COMPLETE CBC AUTOMATED: CPT

## 2024-08-09 RX ORDER — LISINOPRIL 20 MG/1
TABLET ORAL
Qty: 135 TABLET | Refills: 1 | Status: SHIPPED | OUTPATIENT
Start: 2024-08-09 | End: 2024-08-15 | Stop reason: SDUPTHER

## 2024-08-15 ENCOUNTER — OFFICE VISIT (OUTPATIENT)
Dept: CARDIOLOGY CLINIC | Facility: CLINIC | Age: 64
End: 2024-08-15
Payer: COMMERCIAL

## 2024-08-15 VITALS
DIASTOLIC BLOOD PRESSURE: 74 MMHG | HEART RATE: 58 BPM | WEIGHT: 166 LBS | HEIGHT: 63 IN | BODY MASS INDEX: 29.41 KG/M2 | OXYGEN SATURATION: 96 % | SYSTOLIC BLOOD PRESSURE: 120 MMHG | RESPIRATION RATE: 16 BRPM

## 2024-08-15 DIAGNOSIS — I25.10 CORONARY ARTERY DISEASE INVOLVING NATIVE CORONARY ARTERY OF NATIVE HEART WITHOUT ANGINA PECTORIS: ICD-10-CM

## 2024-08-15 DIAGNOSIS — I50.32 HEART FAILURE WITH IMPROVED EJECTION FRACTION (HFIMPEF) (HCC): Primary | ICD-10-CM

## 2024-08-15 DIAGNOSIS — E78.5 DYSLIPIDEMIA: ICD-10-CM

## 2024-08-15 DIAGNOSIS — Z95.5 HISTORY OF PLACEMENT OF STENT IN LAD CORONARY ARTERY: ICD-10-CM

## 2024-08-15 DIAGNOSIS — I10 PRIMARY HYPERTENSION: ICD-10-CM

## 2024-08-15 PROCEDURE — 99214 OFFICE O/P EST MOD 30 MIN: CPT | Performed by: INTERNAL MEDICINE

## 2024-08-15 RX ORDER — LISINOPRIL 20 MG/1
20 TABLET ORAL DAILY
Qty: 90 TABLET | Refills: 1 | Status: SHIPPED | OUTPATIENT
Start: 2024-08-15

## 2024-08-15 RX ORDER — FUROSEMIDE 40 MG
TABLET ORAL
Start: 2024-08-15

## 2024-08-15 NOTE — PATIENT INSTRUCTIONS
No medication changes today  2g sodium diet  2L fluid diet  Daily weights, take lasix as needed for weight gain of 3 lbs in a day or 5 lbs in 5-7 days.

## 2024-08-15 NOTE — PROGRESS NOTES
Advanced Heart Failure Outpatient Progress Note - Mercedes Cardozo 63 y.o. female MRN: [id number]    Encounter: [id number]      Assessment/Plan:    Patient Active Problem List    Diagnosis Date Noted   • Encounter for weight management 07/09/2024   • Chronic systolic congestive heart failure (HCC) 05/24/2024   • Coronary artery disease involving native coronary artery of native heart without angina pectoris 05/24/2024   • Primary hypertension 05/24/2024   • Dyslipidemia 05/24/2024     # Chronic heart failure with improved LVEF, Stage C, NYHA II  Etiology: ischemic    Studies- personally reviewed by me    TTE 6/27/24: LVEF 61%.  LVIDD 4.2 cm.  Normal wall thickness.  Normal wall motion.  Normal diastology.  Normal RV size and systolic function.  Trace MR.  Trace TR.  No pericardial effusion.  TTE 2/11/21 ([facility]): LVEF 40-45%. Anterior hypokinesis, anteroseptal hypokinesis, septal hypokinesis, hypokinesis of the apex.  Mild MR, mild TR, estimated PASP 15 to 20 mmHg.  Normal RV size and systolic function.    Neurohormonal Blockade:  --Beta-Blocker: coreg 12.5mg daily  --ACEi, ARB or ARNi: lisinopril 20mg daily  --Aldosterone Receptor Blocker:  --SGLT2 Inhibitor: was on Jardiance - stopped by PCP per patient  --Diuretic: furosemide 40mg prn    Sudden Cardiac Death Risk Reduction:  --ICD: not indicated, LVEF > 35%    Electrical Resynchronization:  --Candidacy for BiV device: narrow QRS    # CAD s/p stenting of the LAD 2/2021  No anginal symptoms  Continue aspirin, rosuvastatin, Coreg    # Hypertension, controlled  Continue coreg and lisinopril    # Hyperlipidemia, LDL goal < 70  5/8/2024 TG 95 HDL 42 LDL 58  Continue rosuvastatin 20 mg daily    # Prediabetes HbA1c 5.9 5/8/24    # Suspected sleep apnea  Patient deferring sleep study      Today's Plan:  No medication changes today  2g sodium diet  2L fluid diet  Daily weights, take lasix as needed for weight gain of 3 lbs in a day or 5 lbs in 5-7 days.  Patient  "possibly moving to [facility]      HPI:   5/23/24: 63-year-old female with past medical history of coronary artery disease status post PCI, heart failure, hypertension, hyperlipidemia and prediabetes who is here to establish care.  Was previously following up with cardiologist [facility] area.  Patient reports being hospitalized February 2021 with congestive heart failure and extremely elevated blood pressure.  He she also had cardiac catheterization at that time and had stenting x 1 to the \" maker\".  Her blood pressure was untreated prior to that hospitalization but she has had good follow-up posthospitalization.  Blood pressure control overall better and she has been on lisinopril and Coreg since then.  Recent home blood pressure in the 130s.  Reports being on furosemide up until 1 month ago.  She denies any weight gain or shortness of breath while being off of diuretic.  She had a sporadic episode of chest pain while she was stating that lasted for a minute, no recurrence.  Denies any palpitations over the past 6 months to 1 year.  She is staying active kayaking and swimming 1 to 3 hours a day.  Social history: Stopped smoking 10 years ago, occasional alcohol intake up to a bottle on a weekend.  No illicit drug use  Family history: Sister and parents with hypertension, no known coronary disease    8/15/2024: Patient is here for follow-up.  Overall feeling fine.  Denies shortness of breath, leg swelling, PND orthopnea.  No chest pain, palpitations, dizziness lightheadedness.  A little frustrated about trying to lose weight as she has not been successful.  Advised against phentermine use.    Past Medical History:   Diagnosis Date   • Allergic    • Depression    • Elevated cholesterol    • Fatty liver    • Hypertension        Review of Systems   Constitutional:  Negative for chills and fever.   HENT:  Negative for ear pain and sore throat.    Eyes:  Negative for pain and visual disturbance.   Respiratory:  " Negative for cough and shortness of breath.    Cardiovascular:  Negative for chest pain, palpitations and leg swelling.   Gastrointestinal:  Negative for abdominal distention, abdominal pain and vomiting.   Genitourinary:  Negative for dysuria and hematuria.   Musculoskeletal:  Negative for arthralgias and back pain.   Skin:  Negative for color change and rash.   Neurological:  Negative for dizziness, seizures, syncope and light-headedness.   All other systems reviewed and are negative.       Allergies   Allergen Reactions   • Green Tea (Danica Sinensis) - Food Allergy Anaphylaxis   • Shellfish Allergy - Food Allergy Anaphylaxis     Oysters only     .    Current Outpatient Medications:   •  aspirin 81 mg chewable tablet, Chew 81 mg daily, Disp: , Rfl:   •  carvedilol (COREG) 12.5 mg tablet, Take 12.5 mg by mouth 2 (two) times a day with meals, Disp: , Rfl:   •  Cholecalciferol (Vitamin D3) 1.25 MG (16043 UT) TABS, 2 times a week for 12 weeks., Disp: 24 tablet, Rfl: 0  •  DULoxetine (CYMBALTA) 30 mg delayed release capsule, Take 1 capsule (30 mg total) by mouth daily, Disp: 90 capsule, Rfl: 1  •  lisinopril (ZESTRIL) 20 mg tablet, TAKE 1 AND 1/2 TABLETS DAILY BY MOUTH (Patient taking differently: Take 20 mg by mouth daily), Disp: 135 tablet, Rfl: 1  •  metFORMIN (GLUCOPHAGE-XR) 500 mg 24 hr tablet, TAKE 1 TABLET BY MOUTH TWICE A DAY WITH MEALS (Patient taking differently: Take 500 mg by mouth daily with dinner), Disp: 180 tablet, Rfl: 1  •  rosuvastatin (CRESTOR) 40 MG tablet, Take 20 mg by mouth daily, Disp: , Rfl:     Social History     Socioeconomic History   • Marital status:      Spouse name: Not on file   • Number of children: Not on file   • Years of education: Not on file   • Highest education level: Not on file   Occupational History   • Not on file   Tobacco Use   • Smoking status: Former     Types: Cigarettes     Passive exposure: Past   • Smokeless tobacco: Never   Vaping Use   • Vaping status:  "Never Used   Substance and Sexual Activity   • Alcohol use: Yes     Alcohol/week: 4.0 standard drinks of alcohol     Types: 4 Glasses of wine per week   • Drug use: Never   • Sexual activity: Not on file   Other Topics Concern   • Not on file   Social History Narrative   • Not on file     Social Determinants of Health     Financial Resource Strain: Not on file   Food Insecurity: Not on file   Transportation Needs: Not on file   Physical Activity: Sufficiently Active (5/20/2024)    Exercise Vital Sign    • Days of Exercise per Week: 7 days    • Minutes of Exercise per Session: 50 min   Stress: Not on file   Social Connections: Not on file   Intimate Partner Violence: Not on file   Housing Stability: Not on file       Family History   Problem Relation Age of Onset   • Colon cancer Mother    • Arthritis Mother    • Hypertension Mother    • Stroke Mother    • COPD Father    • Leukemia Father    • Hyperlipidemia Sister    • Cancer Maternal Grandmother    • Mental illness Brother    • Breast cancer Paternal Aunt        Physical Exam:    Vitals: Blood pressure 120/74, pulse 58, resp. rate 16, height 5' 3\" (1.6 m), weight 75.3 kg (166 lb), SpO2 96%., Body mass index is 29.41 kg/m².,   Wt Readings from Last 3 Encounters:   08/15/24 75.3 kg (166 lb)   07/09/24 77.7 kg (171 lb 3.2 oz)   06/27/24 79.8 kg (176 lb)       Physical Exam  Constitutional:       General: She is not in acute distress.     Appearance: Normal appearance.   HENT:      Head: Normocephalic and atraumatic.      Mouth/Throat:      Mouth: Mucous membranes are moist.   Eyes:      Extraocular Movements: Extraocular movements intact.      Conjunctiva/sclera: Conjunctivae normal.   Neck:      Vascular: No JVD.   Cardiovascular:      Rate and Rhythm: Normal rate and regular rhythm.      Pulses: Normal pulses.      Heart sounds: No murmur heard.     No friction rub. No gallop.   Pulmonary:      Effort: Pulmonary effort is normal. No respiratory distress.      Breath " "sounds: No wheezing, rhonchi or rales.   Abdominal:      General: There is no distension.      Palpations: Abdomen is soft.      Tenderness: There is no abdominal tenderness. There is no guarding.   Musculoskeletal:         General: No deformity or signs of injury.      Cervical back: Neck supple.      Right lower leg: No edema.      Left lower leg: No edema.   Skin:     General: Skin is warm and dry.      Capillary Refill: Capillary refill takes less than 2 seconds.   Neurological:      General: No focal deficit present.      Mental Status: She is alert and oriented to person, place, and time.   Psychiatric:         Mood and Affect: Mood normal.       Labs & Results:    Lab Results   Component Value Date    WBC 8.90 08/09/2024    HGB 13.0 08/09/2024    HCT 42.4 08/09/2024     (H) 08/09/2024     08/09/2024     Lab Results   Component Value Date    SODIUM 142 08/09/2024    K 4.6 08/09/2024     08/09/2024    CO2 25 08/09/2024    BUN 17 08/09/2024    CREATININE 1.08 08/09/2024    CALCIUM 9.1 08/09/2024     No results found for: \"NTBNP\"   Lab Results   Component Value Date    CHOLESTEROL 119 05/08/2024     Lab Results   Component Value Date    HDL 42 (L) 05/08/2024     Lab Results   Component Value Date    TRIG 95 05/08/2024     No results found for: \"NONHDLC\"    EKG personally reviewed by Janel Mckeon MD.     Counseling / Coordination of Care  I have spent a total time of 30 minutes in caring for this patient on the day of the visit/encounter including Impressions, Counseling / Coordination of care, Documenting in the medical record, Reviewing / ordering tests, medicine, procedures  , and Obtaining or reviewing history  .      Thank you for the opportunity to participate in the care of this patient.    JANEL MCKEON MD  ADVANCED HEART FAILURE AND MECHANICAL CIRCULATORY SUPPORT  [facility]      "

## 2024-08-16 DIAGNOSIS — E55.9 VITAMIN D DEFICIENCY: ICD-10-CM

## 2024-08-16 RX ORDER — METHOCARBAMOL 750 MG/1
TABLET ORAL
Qty: 24 CAPSULE | OUTPATIENT
Start: 2024-08-16

## 2024-09-22 DIAGNOSIS — I10 ESSENTIAL (PRIMARY) HYPERTENSION: ICD-10-CM

## 2024-09-23 RX ORDER — CARVEDILOL 12.5 MG/1
TABLET ORAL
Qty: 180 TABLET | Refills: 3 | Status: SHIPPED | OUTPATIENT
Start: 2024-09-23

## 2024-09-28 DIAGNOSIS — E78.5 DYSLIPIDEMIA: Primary | ICD-10-CM

## 2024-09-28 DIAGNOSIS — F41.9 ANXIETY: ICD-10-CM

## 2024-09-28 RX ORDER — DULOXETIN HYDROCHLORIDE 30 MG/1
30 CAPSULE, DELAYED RELEASE ORAL DAILY
Qty: 90 CAPSULE | Refills: 0 | OUTPATIENT
Start: 2024-09-28

## 2024-09-30 RX ORDER — ROSUVASTATIN CALCIUM 20 MG/1
20 TABLET, COATED ORAL DAILY
Qty: 100 TABLET | Refills: 3 | Status: SHIPPED | OUTPATIENT
Start: 2024-09-30

## 2024-10-27 DIAGNOSIS — E78.5 DYSLIPIDEMIA: ICD-10-CM

## 2024-10-27 DIAGNOSIS — F41.9 ANXIETY: ICD-10-CM

## 2024-10-27 RX ORDER — ROSUVASTATIN CALCIUM 20 MG/1
20 TABLET, COATED ORAL DAILY
Qty: 100 TABLET | Refills: 1 | Status: SHIPPED | OUTPATIENT
Start: 2024-10-27

## 2024-10-27 RX ORDER — DULOXETIN HYDROCHLORIDE 30 MG/1
30 CAPSULE, DELAYED RELEASE ORAL DAILY
Qty: 100 CAPSULE | Refills: 1 | Status: SHIPPED | OUTPATIENT
Start: 2024-10-27

## 2025-01-03 DIAGNOSIS — Z76.89 ENCOUNTER FOR WEIGHT MANAGEMENT: ICD-10-CM

## 2025-01-03 DIAGNOSIS — R73.03 PREDIABETES: ICD-10-CM

## 2025-01-03 RX ORDER — METFORMIN HYDROCHLORIDE 500 MG/1
500 TABLET, EXTENDED RELEASE ORAL 2 TIMES DAILY WITH MEALS
Qty: 180 TABLET | Refills: 1 | Status: SHIPPED | OUTPATIENT
Start: 2025-01-03

## 2025-03-18 DIAGNOSIS — I50.32 HEART FAILURE WITH IMPROVED EJECTION FRACTION (HFIMPEF) (HCC): ICD-10-CM

## 2025-03-19 RX ORDER — LISINOPRIL 20 MG/1
30 TABLET ORAL DAILY
Qty: 135 TABLET | Refills: 1 | Status: SHIPPED | OUTPATIENT
Start: 2025-03-19

## 2025-04-18 DIAGNOSIS — F41.9 ANXIETY: ICD-10-CM

## 2025-04-18 RX ORDER — DULOXETIN HYDROCHLORIDE 30 MG/1
30 CAPSULE, DELAYED RELEASE ORAL DAILY
Qty: 30 CAPSULE | Refills: 0 | Status: SHIPPED | OUTPATIENT
Start: 2025-04-18

## 2025-04-18 NOTE — TELEPHONE ENCOUNTER
Patient needs an appointment. Please contact the patient to schedule an appointment. Last office visit: 7/9/24

## 2025-04-25 DIAGNOSIS — E78.5 DYSLIPIDEMIA: ICD-10-CM

## 2025-04-25 RX ORDER — ROSUVASTATIN CALCIUM 20 MG/1
20 TABLET, COATED ORAL DAILY
Qty: 90 TABLET | Refills: 0 | Status: SHIPPED | OUTPATIENT
Start: 2025-04-25

## 2025-05-14 DIAGNOSIS — F41.9 ANXIETY: ICD-10-CM

## 2025-05-15 RX ORDER — DULOXETIN HYDROCHLORIDE 30 MG/1
30 CAPSULE, DELAYED RELEASE ORAL DAILY
Qty: 30 CAPSULE | Refills: 0 | Status: SHIPPED | OUTPATIENT
Start: 2025-05-15

## 2025-06-12 DIAGNOSIS — F41.9 ANXIETY: ICD-10-CM

## 2025-06-12 RX ORDER — DULOXETIN HYDROCHLORIDE 30 MG/1
30 CAPSULE, DELAYED RELEASE ORAL DAILY
Qty: 30 CAPSULE | Refills: 0 | Status: SHIPPED | OUTPATIENT
Start: 2025-06-12

## 2025-06-24 DIAGNOSIS — Z76.89 ENCOUNTER FOR WEIGHT MANAGEMENT: ICD-10-CM

## 2025-06-24 DIAGNOSIS — R73.03 PREDIABETES: ICD-10-CM

## 2025-06-25 RX ORDER — METFORMIN HYDROCHLORIDE 500 MG/1
500 TABLET, EXTENDED RELEASE ORAL 2 TIMES DAILY WITH MEALS
Qty: 60 TABLET | Refills: 0 | Status: SHIPPED | OUTPATIENT
Start: 2025-06-25

## 2025-07-06 DIAGNOSIS — F41.9 ANXIETY: ICD-10-CM

## 2025-07-08 RX ORDER — DULOXETIN HYDROCHLORIDE 30 MG/1
30 CAPSULE, DELAYED RELEASE ORAL DAILY
Qty: 90 CAPSULE | Refills: 0 | Status: SHIPPED | OUTPATIENT
Start: 2025-07-08

## 2025-07-08 NOTE — TELEPHONE ENCOUNTER
Please call patient to schedule an appointment to be seen. They were provided 90-day supply without any additional refills. Pt needs to be seen for further refills.

## 2025-07-18 DIAGNOSIS — E78.5 DYSLIPIDEMIA: ICD-10-CM

## 2025-07-18 RX ORDER — ROSUVASTATIN CALCIUM 20 MG/1
20 TABLET, COATED ORAL DAILY
Qty: 90 TABLET | Refills: 0 | Status: SHIPPED | OUTPATIENT
Start: 2025-07-18

## 2025-07-25 DIAGNOSIS — Z76.89 ENCOUNTER FOR WEIGHT MANAGEMENT: ICD-10-CM

## 2025-07-25 DIAGNOSIS — R73.03 PREDIABETES: ICD-10-CM

## 2025-07-28 RX ORDER — METFORMIN HYDROCHLORIDE 500 MG/1
500 TABLET, EXTENDED RELEASE ORAL 2 TIMES DAILY WITH MEALS
Qty: 60 TABLET | Refills: 0 | Status: SHIPPED | OUTPATIENT
Start: 2025-07-28